# Patient Record
Sex: MALE | Race: ASIAN | NOT HISPANIC OR LATINO
[De-identification: names, ages, dates, MRNs, and addresses within clinical notes are randomized per-mention and may not be internally consistent; named-entity substitution may affect disease eponyms.]

---

## 2024-06-11 ENCOUNTER — APPOINTMENT (OUTPATIENT)
Dept: UROLOGY | Facility: CLINIC | Age: 69
End: 2024-06-11
Payer: MEDICAID

## 2024-06-11 VITALS
BODY MASS INDEX: 29.32 KG/M2 | RESPIRATION RATE: 18 BRPM | HEIGHT: 64.96 IN | HEART RATE: 63 BPM | OXYGEN SATURATION: 94 % | SYSTOLIC BLOOD PRESSURE: 125 MMHG | TEMPERATURE: 97.5 F | WEIGHT: 176 LBS | DIASTOLIC BLOOD PRESSURE: 74 MMHG

## 2024-06-11 DIAGNOSIS — Z80.9 FAMILY HISTORY OF MALIGNANT NEOPLASM, UNSPECIFIED: ICD-10-CM

## 2024-06-11 DIAGNOSIS — N13.8 BENIGN PROSTATIC HYPERPLASIA WITH LOWER URINARY TRACT SYMPMS: ICD-10-CM

## 2024-06-11 DIAGNOSIS — Z78.9 OTHER SPECIFIED HEALTH STATUS: ICD-10-CM

## 2024-06-11 DIAGNOSIS — Z87.891 PERSONAL HISTORY OF NICOTINE DEPENDENCE: ICD-10-CM

## 2024-06-11 DIAGNOSIS — N40.1 BENIGN PROSTATIC HYPERPLASIA WITH LOWER URINARY TRACT SYMPMS: ICD-10-CM

## 2024-06-11 DIAGNOSIS — N48.1 BALANITIS: ICD-10-CM

## 2024-06-11 DIAGNOSIS — Z00.00 ENCOUNTER FOR GENERAL ADULT MEDICAL EXAMINATION W/OUT ABNORMAL FINDINGS: ICD-10-CM

## 2024-06-11 PROCEDURE — 99204 OFFICE O/P NEW MOD 45 MIN: CPT

## 2024-06-11 PROCEDURE — G2211 COMPLEX E/M VISIT ADD ON: CPT | Mod: NC,1L

## 2024-06-11 RX ORDER — CLOTRIMAZOLE AND BETAMETHASONE DIPROPIONATE 10; .5 MG/G; MG/G
1-0.05 CREAM TOPICAL TWICE DAILY
Qty: 1 | Refills: 3 | Status: ACTIVE | COMMUNITY
Start: 2024-06-11 | End: 1900-01-01

## 2024-06-11 RX ORDER — TAMSULOSIN HYDROCHLORIDE 0.4 MG/1
0.4 CAPSULE ORAL
Qty: 30 | Refills: 3 | Status: ACTIVE | COMMUNITY
Start: 2024-06-11 | End: 1900-01-01

## 2024-06-11 NOTE — LETTER BODY
[FreeTextEntry1] :  Jeb Oneal MD [address] [place] [address] [address] [place] [address], [phone]   Dear Dr. Oneal,     Reason for Visit: BPH. Balanitis.       This is a 69 year-old gentleman with symptoms of BPH and balanitis. Patient is here today for evaluation. Patient reports he has weak uroflow, frequency, and hesitancy. He denies any hematuria or urinary incontinence. His symptoms are aggravated by hydration. He denies any alleviating factors. He has not tried any medical therapy previously. He also reports of balanitis. He reports no pain. All other review of systems are negative. He has no cancer in his family medical history. He has no previous surgical history. Past medical history, family history and social history were inquired and were noncontributory to current condition. The patient does not use tobacco or drink alcohol. Medications and allergies were reviewed. He has no known allergies to medication.       On examination, the patient is a healthy-appearing gentleman in no acute distress. He is alert and oriented follows commands. He has normal mood and affect. He is normocephalic. Neck is supple. Oral no thrush Respirations are unlabored. His abdomen is soft and nontender. Bladder is nonpalpable. No CVA tenderness. Neurologically he is grossly intact. No peripheral edema. Skin without gross abnormality. He has normal male external genitalia. Normal meatus. Bilateral testes are descended intrascrotally and normal to palpation. On rectal examination, there is normal sphincter tone. The prostate is clinically benign without focal induration or nodularity.   His A1C demonstrated elevated blood sugar levels, 5.7%.    ASSESSMENT: BPH. Balanitis.       I counseled the patient on the various etiology of his symptoms. I discussed the natural history of BPH and the treatment options available. I discussed the options of conservative management with fluid in dietary restrictions, herbal therapy, medical therapy, and minimally invasive procedures. Risk and benefits were discussed. I answered his questions. I recommended he try Flomax. I recommended the patient repeat PSA and BMP to establish baseline. In terms of his balanitis, I explained that because he is uncircumcised, he is more likely to develop balanitis as a result of improper hygiene compared to circumcised individuals. I recommended the patient start a trial of Clotrimazole-Betamethasone cream. I discussed the potential side effects of Flomax and Clotrimazole-Betamethasone cream. I counseled the patient on their use and side effects. If the patient develops any side effects, the patient will discontinue medications and contact me. He will also obtain A1c testing today to monitor glycemic control. I recommended the patient obtain urinalysis to ensure stability. Risks and alternatives were discussed. I answered the patient questions. The patient will follow-up as directed and will contact me with any questions or concerns. Thank you for the opportunity to participate in the care of Mr. GARCIA. I will keep you updated on his progress.       Plan: Trial of Flomax and Clotrimazole-Betamethasone cream. PSA. BMP. Urinalysis. [id number]. Follow up in 1 month.

## 2024-06-11 NOTE — ADDENDUM
[FreeTextEntry1] : Entered by Nish Stoner, acting as scribe for Dr. Aquilino Kathleen. The documentation recorded by the scribe accurately reflects the service I personally performed and the decisions made by me.

## 2024-06-12 LAB
ANION GAP SERPL CALC-SCNC: 13 MMOL/L
APPEARANCE: CLEAR
BACTERIA: NEGATIVE /HPF
BILIRUBIN URINE: NEGATIVE
BLOOD URINE: NEGATIVE
BUN SERPL-MCNC: 22 MG/DL
CALCIUM OXALATE CRYSTALS: PRESENT
CALCIUM SERPL-MCNC: 9.2 MG/DL
CAST: 0 /LPF
CHLORIDE SERPL-SCNC: 107 MMOL/L
CO2 SERPL-SCNC: 22 MMOL/L
COLOR: YELLOW
CREAT SERPL-MCNC: 1.12 MG/DL
EGFR: 72 ML/MIN/1.73M2
EPITHELIAL CELLS: 0 /HPF
ESTIMATED AVERAGE GLUCOSE: 126 MG/DL
GLUCOSE QUALITATIVE U: >=1000 MG/DL
GLUCOSE SERPL-MCNC: 102 MG/DL
HBA1C MFR BLD HPLC: 6 %
KETONES URINE: NEGATIVE MG/DL
LEUKOCYTE ESTERASE URINE: NEGATIVE
MICROSCOPIC-UA: NORMAL
NITRITE URINE: NEGATIVE
PH URINE: 5.5
POTASSIUM SERPL-SCNC: 4.1 MMOL/L
PROTEIN URINE: NORMAL MG/DL
PSA FREE FLD-MCNC: 18 %
PSA FREE SERPL-MCNC: 0.56 NG/ML
PSA SERPL-MCNC: 3.13 NG/ML
RED BLOOD CELLS URINE: 2 /HPF
REVIEW: NORMAL
SODIUM SERPL-SCNC: 142 MMOL/L
SPECIFIC GRAVITY URINE: >1.03
UROBILINOGEN URINE: 0.2 MG/DL
WHITE BLOOD CELLS URINE: 1 /HPF

## 2024-07-22 ENCOUNTER — NON-APPOINTMENT (OUTPATIENT)
Age: 69
End: 2024-07-22

## 2024-07-23 ENCOUNTER — APPOINTMENT (OUTPATIENT)
Dept: UROLOGY | Facility: CLINIC | Age: 69
End: 2024-07-23
Payer: MEDICAID

## 2024-07-23 VITALS
TEMPERATURE: 97.2 F | HEART RATE: 60 BPM | BODY MASS INDEX: 28.82 KG/M2 | RESPIRATION RATE: 16 BRPM | WEIGHT: 173 LBS | SYSTOLIC BLOOD PRESSURE: 121 MMHG | OXYGEN SATURATION: 98 % | DIASTOLIC BLOOD PRESSURE: 77 MMHG

## 2024-07-23 PROCEDURE — G2211 COMPLEX E/M VISIT ADD ON: CPT | Mod: NC,1L

## 2024-07-23 PROCEDURE — 99214 OFFICE O/P EST MOD 30 MIN: CPT

## 2024-07-24 NOTE — LETTER BODY
[FreeTextEntry1] : Jeb Oneal MD [address] [place] [address] [address] [place] [address], [phone]  Dear Dr. Oneal,   Reason for Visit: BPH. Balanitis.    This is a 69 year-old gentleman with symptoms of BPH and balanitis. Patient is here today for follow-up. Since he was last seen, the patient reports taking Flomax for his BPH.  Patient reports taking the medications regularly without any side effects or difficulties with the medication. He notes improved urinary symptoms on medical therapy. His symptoms are aggravated by hydration.  He has not tried any medical therapy previously. He also reports applying Clotrimazole-Betamethasone cream for his balanitis.  Patient reports taking the medications regularly without any side effects or difficulties with the medication. He reports mild symptoms of balanitis. He reports no pain. All other review of systems are negative. He has no cancer in his family medical history. He has no previous surgical history. Past medical history, family history and social history were inquired and were noncontributory to current condition. The patient does not use tobacco or drink alcohol. Medications and allergies were reviewed. He has no known allergies to medication.    On examination, the patient is a healthy-appearing gentleman in no acute distress. He is alert and oriented follows commands. He has normal mood and affect. He is normocephalic. Neck is supple. Oral no thrush Respirations are unlabored. His abdomen is soft and nontender. Bladder is nonpalpable. No CVA tenderness. Neurologically he is grossly intact. No peripheral edema. Skin without gross abnormality. He has normal male external genitalia. Normal meatus. Bilateral testes are descended intrascrotally and normal to palpation. On rectal examination, there is normal sphincter tone. The prostate is clinically benign without focal induration or nodularity.    His BMP in June 2024 demonstrated normal renal functions, creatinine 1.12. His PSA was 3.13, which is within normal limits. His urinalysis was unremarkable.    His A1C demonstrated elevated blood sugar levels, 6.0%.   ASSESSMENT: BPH. Balanitis.    I counseled the patient. In terms of his BPH, the patient reports improved urinary symptoms on medical therapy. I recommended the patient continue taking Flomax. In terms of his balanitis, patient has mild symptoms of balanitis. I recommended he continue applying Clotrimazole-Betamethasone cream. The patient may also consider circumcision of symptoms persist. I renewed the patient's prescription for Flomax and Clotrimazole-Betamethasone cream. today. I encouraged the patient to continue medications regularly as directed. Risks and alternatives were discussed. I answered the patient questions. The patient will follow-up as directed and will contact me with any questions or concerns. Thank you for the opportunity to participate in the care of Mr. GARCIA. I will keep you updated on his progress.    Plan: Continue Flomax and Clotrimazole-Betamethasone cream. Consider circumcision. Follow up in 6 months.

## 2024-07-24 NOTE — LETTER BODY
[FreeTextEntry1] : Jeb Oneal MD [address] [place] [address] [address] [place] [address], [phone]  Dear Dr. Oneal,   Reason for Visit: BPH. Balanitis.    This is a 69 year-old gentleman with symptoms of BPH and balanitis. Patient is here today for follow-up. Since he was last seen, the patient reports taking Flomax for his BPH.  Patient reports taking the medications regularly without any side effects or difficulties with the medication. He notes improved urinary symptoms on medical therapy. His symptoms are aggravated by hydration.  He has not tried any medical therapy previously. He also reports applying Clotrimazole-Betamethasone cream for his balanitis.  Patient reports taking the medications regularly without any side effects or difficulties with the medication. He reports mild symptoms of balanitis. He reports no pain. All other review of systems are negative. He has no cancer in his family medical history. He has no previous surgical history. Past medical history, family history and social history were inquired and were noncontributory to current condition. The patient does not use tobacco or drink alcohol. Medications and allergies were reviewed. He has no known allergies to medication.    On examination, the patient is a healthy-appearing gentleman in no acute distress. He is alert and oriented follows commands. He has normal mood and affect. He is normocephalic. Neck is supple. Oral no thrush Respirations are unlabored. His abdomen is soft and nontender. Bladder is nonpalpable. No CVA tenderness. Neurologically he is grossly intact. No peripheral edema. Skin without gross abnormality. He has normal male external genitalia. Normal meatus. Bilateral testes are descended intrascrotally and normal to palpation. On rectal examination, there is normal sphincter tone. The prostate is clinically benign without focal induration or nodularity.    His BMP in June 2024 demonstrated normal renal functions, creatinine 1.12. His PSA was 3.13, which is within normal limits. His urinalysis was unremarkable.    His A1C demonstrated elevated blood sugar levels, 6.0%.   ASSESSMENT: BPH. Balanitis.    I counseled the patient. In terms of his BPH, the patient reports improved urinary symptoms on medical therapy. I recommended the patient continue taking Flomax. In terms of his balanitis, patient has mild symptoms of balanitis. I recommended he continue applying Clotrimazole-Betamethasone cream. The patient may also consider circumcision of symptoms persist. I renewed the patient's prescription for Flomax and Clotrimazole-Betamethasone cream. today. I encouraged the patient to continue medications regularly as directed. Risks and alternatives were discussed. I answered the patient questions. The patient will follow-up as directed and will contact me with any questions or concerns. Thank you for the opportunity to participate in the care of Mr. GARCIA. I will keep you updated on his progress.    Plan: Continue Flomax and Clotrimazole-Betamethasone cream. Consider circumcision. Follow up in 6 months.

## 2024-10-22 ENCOUNTER — APPOINTMENT (OUTPATIENT)
Dept: UROLOGY | Facility: CLINIC | Age: 69
End: 2024-10-22
Payer: MEDICAID

## 2024-10-22 VITALS
RESPIRATION RATE: 18 BRPM | HEART RATE: 69 BPM | DIASTOLIC BLOOD PRESSURE: 82 MMHG | SYSTOLIC BLOOD PRESSURE: 135 MMHG | OXYGEN SATURATION: 96 % | WEIGHT: 179 LBS | BODY MASS INDEX: 29.82 KG/M2

## 2024-10-22 DIAGNOSIS — N48.1 BALANITIS: ICD-10-CM

## 2024-10-22 DIAGNOSIS — N13.8 BENIGN PROSTATIC HYPERPLASIA WITH LOWER URINARY TRACT SYMPMS: ICD-10-CM

## 2024-10-22 DIAGNOSIS — N40.1 BENIGN PROSTATIC HYPERPLASIA WITH LOWER URINARY TRACT SYMPMS: ICD-10-CM

## 2024-10-22 DIAGNOSIS — R80.9 PROTEINURIA, UNSPECIFIED: ICD-10-CM

## 2024-10-22 PROCEDURE — 99214 OFFICE O/P EST MOD 30 MIN: CPT

## 2024-10-22 PROCEDURE — G2211 COMPLEX E/M VISIT ADD ON: CPT | Mod: NC

## 2024-11-22 ENCOUNTER — NON-APPOINTMENT (OUTPATIENT)
Age: 69
End: 2024-11-22

## 2024-11-22 ENCOUNTER — APPOINTMENT (OUTPATIENT)
Dept: UROLOGY | Facility: CLINIC | Age: 69
End: 2024-11-22
Payer: MEDICAID

## 2024-11-22 VITALS
BODY MASS INDEX: 29.99 KG/M2 | HEART RATE: 75 BPM | SYSTOLIC BLOOD PRESSURE: 137 MMHG | DIASTOLIC BLOOD PRESSURE: 79 MMHG | RESPIRATION RATE: 18 BRPM | WEIGHT: 180 LBS | OXYGEN SATURATION: 97 %

## 2024-11-22 DIAGNOSIS — N48.1 BALANITIS: ICD-10-CM

## 2024-11-22 DIAGNOSIS — N13.8 BENIGN PROSTATIC HYPERPLASIA WITH LOWER URINARY TRACT SYMPMS: ICD-10-CM

## 2024-11-22 DIAGNOSIS — N40.1 BENIGN PROSTATIC HYPERPLASIA WITH LOWER URINARY TRACT SYMPMS: ICD-10-CM

## 2024-11-22 PROCEDURE — G2211 COMPLEX E/M VISIT ADD ON: CPT | Mod: NC

## 2024-11-22 PROCEDURE — 99214 OFFICE O/P EST MOD 30 MIN: CPT

## 2024-11-22 RX ORDER — OXYBUTYNIN CHLORIDE 5 MG/1
5 TABLET, EXTENDED RELEASE ORAL
Qty: 30 | Refills: 3 | Status: ACTIVE | COMMUNITY
Start: 2024-11-22 | End: 1900-01-01

## 2025-02-28 ENCOUNTER — APPOINTMENT (OUTPATIENT)
Dept: UROLOGY | Facility: CLINIC | Age: 70
End: 2025-02-28

## 2025-05-27 ENCOUNTER — APPOINTMENT (OUTPATIENT)
Dept: UROLOGY | Facility: CLINIC | Age: 70
End: 2025-05-27
Payer: MEDICAID

## 2025-05-27 VITALS
WEIGHT: 177 LBS | BODY MASS INDEX: 29.49 KG/M2 | OXYGEN SATURATION: 96 % | DIASTOLIC BLOOD PRESSURE: 66 MMHG | SYSTOLIC BLOOD PRESSURE: 103 MMHG | RESPIRATION RATE: 18 BRPM | HEART RATE: 63 BPM | TEMPERATURE: 97.5 F

## 2025-05-27 DIAGNOSIS — N40.1 BENIGN PROSTATIC HYPERPLASIA WITH LOWER URINARY TRACT SYMPMS: ICD-10-CM

## 2025-05-27 DIAGNOSIS — N13.8 BENIGN PROSTATIC HYPERPLASIA WITH LOWER URINARY TRACT SYMPMS: ICD-10-CM

## 2025-05-27 DIAGNOSIS — N48.1 BALANITIS: ICD-10-CM

## 2025-05-27 DIAGNOSIS — R35.0 FREQUENCY OF MICTURITION: ICD-10-CM

## 2025-05-27 PROCEDURE — G2211 COMPLEX E/M VISIT ADD ON: CPT | Mod: NC

## 2025-05-27 PROCEDURE — 99214 OFFICE O/P EST MOD 30 MIN: CPT

## 2025-05-28 DIAGNOSIS — R97.20 ELEVATED PROSTATE, SPECIFIC ANTIGEN [PSA]: ICD-10-CM

## 2025-05-28 LAB
ANION GAP SERPL CALC-SCNC: 13 MMOL/L
BUN SERPL-MCNC: 18 MG/DL
CALCIUM SERPL-MCNC: 9.2 MG/DL
CHLORIDE SERPL-SCNC: 106 MMOL/L
CO2 SERPL-SCNC: 24 MMOL/L
CREAT SERPL-MCNC: 1.13 MG/DL
EGFRCR SERPLBLD CKD-EPI 2021: 70 ML/MIN/1.73M2
GLUCOSE SERPL-MCNC: 93 MG/DL
POTASSIUM SERPL-SCNC: 4 MMOL/L
PSA FREE FLD-MCNC: 16 %
PSA FREE SERPL-MCNC: 0.64 NG/ML
PSA SERPL-MCNC: 4.04 NG/ML
SODIUM SERPL-SCNC: 143 MMOL/L

## 2025-05-28 RX ORDER — SODIUM PHOSPHATE, DIBASIC AND SODIUM PHOSPHATE, MONOBASIC 7; 19 G/230ML; G/230ML
ENEMA RECTAL
Qty: 1 | Refills: 0 | Status: ACTIVE | COMMUNITY
Start: 2025-05-28 | End: 1900-01-01

## 2025-07-02 ENCOUNTER — NON-APPOINTMENT (OUTPATIENT)
Age: 70
End: 2025-07-02